# Patient Record
Sex: MALE | Race: WHITE
[De-identification: names, ages, dates, MRNs, and addresses within clinical notes are randomized per-mention and may not be internally consistent; named-entity substitution may affect disease eponyms.]

---

## 2021-01-20 ENCOUNTER — TRANSCRIPTION ENCOUNTER (OUTPATIENT)
Age: 71
End: 2021-01-20

## 2022-04-07 ENCOUNTER — APPOINTMENT (OUTPATIENT)
Dept: PHYSICAL MEDICINE AND REHAB | Facility: CLINIC | Age: 72
End: 2022-04-07

## 2022-04-07 VITALS
TEMPERATURE: 97.8 F | BODY MASS INDEX: 25.62 KG/M2 | HEART RATE: 80 BPM | HEIGHT: 71 IN | OXYGEN SATURATION: 99 % | WEIGHT: 183 LBS

## 2022-04-07 DIAGNOSIS — Z78.9 OTHER SPECIFIED HEALTH STATUS: ICD-10-CM

## 2022-04-07 DIAGNOSIS — I10 ESSENTIAL (PRIMARY) HYPERTENSION: ICD-10-CM

## 2022-04-07 DIAGNOSIS — E78.5 HYPERLIPIDEMIA, UNSPECIFIED: ICD-10-CM

## 2022-04-07 PROBLEM — Z00.00 ENCOUNTER FOR PREVENTIVE HEALTH EXAMINATION: Status: ACTIVE | Noted: 2022-04-07

## 2022-04-07 RX ORDER — PROPRANOLOL HYDROCHLORIDE 10 MG/1
10 TABLET ORAL
Refills: 0 | Status: ACTIVE | COMMUNITY

## 2022-04-07 RX ORDER — CYCLOBENZAPRINE HYDROCHLORIDE 5 MG/1
5 TABLET, FILM COATED ORAL 3 TIMES DAILY
Qty: 30 | Refills: 0 | Status: ACTIVE | COMMUNITY
Start: 2022-04-07 | End: 1900-01-01

## 2022-04-07 RX ORDER — VALSARTAN 320 MG/1
320 TABLET, COATED ORAL
Refills: 0 | Status: ACTIVE | COMMUNITY

## 2022-04-07 RX ORDER — ROSUVASTATIN CALCIUM 20 MG/1
20 TABLET, FILM COATED ORAL
Refills: 0 | Status: ACTIVE | COMMUNITY

## 2022-04-07 RX ORDER — EZETIMIBE 10 MG/1
10 TABLET ORAL
Refills: 0 | Status: ACTIVE | COMMUNITY

## 2022-04-07 RX ORDER — CHLORTHALIDONE 25 MG/1
25 TABLET ORAL
Refills: 0 | Status: ACTIVE | COMMUNITY

## 2022-04-07 NOTE — ASSESSMENT
[FreeTextEntry1] : Flexeril 5 mg po TID #30\par HEP reviewed with patient. \par Recheck in 2 weeks if symptomsl persist initiation of trigger point injections the to the right parascapular musculature to dissipate muscle spasm.

## 2022-04-07 NOTE — DATA REVIEWED
[FreeTextEntry1] : MRI RIGHT SHOULDER NOÉ\par 12/09/2021\par DR. TURNER PROGRESS NOTES \par 12/09/2021 & 01/20/2022

## 2022-04-07 NOTE — PHYSICAL EXAM
[Normal] : Normal skin color and pigmentation, normal turgor and no rash [FreeTextEntry1] : EXAMINATION OF THE LEFT SHOULDER: \par On palpation, parascapular muscle spasm.\par Range of motion testing was performed with the use of a goniometer.  On range of motion testing, \par active flexion was to 170º,  (normal flexion - 180º), active abduction was to 175º, (normal abduction - 180º), active external rotation was to 80º, (normal external rotation - 90º), active internal rotation was to 65º, (normal internal rotation - 90º), active extension was to 40º, (normal extension - 45º-60º). \par Anterior and Posterior Apprehension Testing was (- ) \par There was a mildly +  Impingement Sign.  Supraspinatus Test was (+).  \par MMT Flexion 5-/5, abduction 5-/5, external rotation 5-/5, and  internal rotation 5/5.\par Good scapula mobility\par No scapula winging \par \par Examination of the Cervical Spine \par Good ROM of cervical spine all planes. Cervical spine is non tender \par Reflexes are 2's and symmetrical throughout the upper extremities. \par Sensory: Intact [de-identified] : see exam  [de-identified] : see exam  [de-identified] : see exam

## 2022-04-07 NOTE — CONSULT LETTER
[Dear  ___] : Dear  [unfilled], [Consult Letter:] : I had the pleasure of evaluating your patient, [unfilled]. [Please see my note below.] : Please see my note below. [Consult Closing:] : Thank you very much for allowing me to participate in the care of this patient.  If you have any questions, please do not hesitate to contact me. [Sincerely,] : Sincerely, [FreeTextEntry3] : Julian Anne M.D

## 2022-04-27 ENCOUNTER — APPOINTMENT (OUTPATIENT)
Dept: PHYSICAL MEDICINE AND REHAB | Facility: CLINIC | Age: 72
End: 2022-04-27
Payer: MEDICARE

## 2022-04-27 PROCEDURE — 99213 OFFICE O/P EST LOW 20 MIN: CPT

## 2022-04-27 NOTE — HISTORY OF PRESENT ILLNESS
[FreeTextEntry1] : Pt presents in my office today for eval of right knee pain with limited ROM onset of  right knee pain approx 3 weeks ago after gardening. Pain is aggravated with ambulation and swelling since then

## 2022-04-27 NOTE — PHYSICAL EXAM
[FreeTextEntry1] : Right  Knee Examination \par Flexion: 105 degrees\par Extension:full\par \par Palpation:medial joint ,distal medial hamstring tender\par \par MMT:knee extension 5-/5\par \par Swelling: mild\par No increase temperature: \par Patella Compression: +\par Esvin Test:+\par Crepitus:+\par Anterior and posterior draw - \par Collateral ligaments intact

## 2022-04-28 ENCOUNTER — RESULT REVIEW (OUTPATIENT)
Age: 72
End: 2022-04-28

## 2022-05-03 ENCOUNTER — APPOINTMENT (OUTPATIENT)
Dept: PHYSICAL MEDICINE AND REHAB | Facility: CLINIC | Age: 72
End: 2022-05-03
Payer: MEDICARE

## 2022-05-03 PROCEDURE — 20553 NJX 1/MLT TRIGGER POINTS 3/>: CPT

## 2022-05-03 PROCEDURE — J3490M: CUSTOM

## 2022-05-03 PROCEDURE — 99213 OFFICE O/P EST LOW 20 MIN: CPT | Mod: 25

## 2022-05-10 ENCOUNTER — APPOINTMENT (OUTPATIENT)
Dept: PHYSICAL MEDICINE AND REHAB | Facility: CLINIC | Age: 72
End: 2022-05-10
Payer: MEDICARE

## 2022-05-10 PROCEDURE — 99213 OFFICE O/P EST LOW 20 MIN: CPT | Mod: 25

## 2022-05-10 PROCEDURE — 20553 NJX 1/MLT TRIGGER POINTS 3/>: CPT

## 2022-05-12 ENCOUNTER — APPOINTMENT (OUTPATIENT)
Dept: ORTHOPEDIC SURGERY | Facility: CLINIC | Age: 72
End: 2022-05-12
Payer: MEDICARE

## 2022-05-12 VITALS — HEIGHT: 71 IN | WEIGHT: 183 LBS | BODY MASS INDEX: 25.62 KG/M2

## 2022-05-12 DIAGNOSIS — Z78.9 OTHER SPECIFIED HEALTH STATUS: ICD-10-CM

## 2022-05-12 DIAGNOSIS — M76.891 OTHER SPECIFIED ENTHESOPATHIES OF RIGHT LOWER LIMB, EXCLUDING FOOT: ICD-10-CM

## 2022-05-12 DIAGNOSIS — Z86.79 PERSONAL HISTORY OF OTHER DISEASES OF THE CIRCULATORY SYSTEM: ICD-10-CM

## 2022-05-12 DIAGNOSIS — Z86.39 PERSONAL HISTORY OF OTHER ENDOCRINE, NUTRITIONAL AND METABOLIC DISEASE: ICD-10-CM

## 2022-05-12 DIAGNOSIS — S83.241A OTHER TEAR OF MEDIAL MENISCUS, CURRENT INJURY, RIGHT KNEE, INITIAL ENCOUNTER: ICD-10-CM

## 2022-05-12 PROCEDURE — 99214 OFFICE O/P EST MOD 30 MIN: CPT

## 2022-05-12 NOTE — ASSESSMENT
[FreeTextEntry1] : 72yo male with MRI finding of medial meniscus tear.  Pain localized to posterior knee along distal aspect of hamstrings.  Advised patient to continue nonsurgical management of hamstring tendonitis with PMR Dr. Anne.  His pain at this time is not localized to medial joint and unlikley related to meniscus tear.  Would not recommend arthroscopic surgery at this time since his clinical exam is more consistent with hamstring strain/tendonitis.\par \par f/u prn

## 2022-05-12 NOTE — HISTORY OF PRESENT ILLNESS
[Sudden] : sudden [6] : 6 [2] : 2 [Dull/Aching] : dull/aching [Sharp] : sharp [Throbbing] : throbbing [Intermittent] : intermittent [Household chores] : household chores [Leisure] : leisure [Sleep] : sleep [Social interactions] : social interactions [Rest] : rest [Retired] : Work status: retired [de-identified] : Patient is here for his right knee. Patient states he stepped back and started to feel a sharp pain. Patient states this happened approx 6 weeks ago.  Patient states he is having most of his pain in the back of the knee. Patient had MRI/X-ray  done at [facility] \par \par IMPRESSION:\par \par \par Complex medial meniscus tear with a posterior 7 mm flap meniscal fragment and\par moderate reactive medial collateral ligament sprain and medial tibial plateau\par edema. Small-moderate joint effusion. Mild cartilage wear. [] : Post Surgical Visit: no [FreeTextEntry1] : Right knee [FreeTextEntry3] : 6 weeks ago  [FreeTextEntry5] : Patient states he stepped back and started to feel a sharp pain.  [de-identified] : movement  [de-identified] : Dr. Anne  [de-identified] : MRI/X-ray at [facility]

## 2022-05-17 ENCOUNTER — APPOINTMENT (OUTPATIENT)
Dept: PHYSICAL MEDICINE AND REHAB | Facility: CLINIC | Age: 72
End: 2022-05-17
Payer: MEDICARE

## 2022-05-17 PROCEDURE — 20553 NJX 1/MLT TRIGGER POINTS 3/>: CPT

## 2022-05-17 PROCEDURE — 99213 OFFICE O/P EST LOW 20 MIN: CPT | Mod: 25

## 2022-05-17 RX ORDER — DEXAMETHASONE SODIUM PHOSPHATE 4 MG/ML
4 INJECTION, SOLUTION INTRAMUSCULAR; INTRAVENOUS
Qty: 0 | Refills: 0 | Status: COMPLETED | OUTPATIENT
Start: 2022-05-17

## 2022-05-17 RX ORDER — LIDOCAINE HYDROCHLORIDE 10 MG/ML
1 INJECTION, SOLUTION INFILTRATION; PERINEURAL
Refills: 0 | Status: COMPLETED | OUTPATIENT
Start: 2022-05-17

## 2022-05-24 ENCOUNTER — APPOINTMENT (OUTPATIENT)
Dept: PHYSICAL MEDICINE AND REHAB | Facility: CLINIC | Age: 72
End: 2022-05-24

## 2022-05-24 DIAGNOSIS — M23.91 UNSPECIFIED INTERNAL DERANGEMENT OF RIGHT KNEE: ICD-10-CM

## 2022-05-24 DIAGNOSIS — M17.5 OTHER UNILATERAL SECONDARY OSTEOARTHRITIS OF KNEE: ICD-10-CM

## 2022-05-24 PROCEDURE — 99213 OFFICE O/P EST LOW 20 MIN: CPT | Mod: 25

## 2022-05-24 PROCEDURE — 20553 NJX 1/MLT TRIGGER POINTS 3/>: CPT

## 2022-05-31 ENCOUNTER — APPOINTMENT (OUTPATIENT)
Dept: PHYSICAL MEDICINE AND REHAB | Facility: CLINIC | Age: 72
End: 2022-05-31

## 2022-06-07 ENCOUNTER — APPOINTMENT (OUTPATIENT)
Dept: PHYSICAL MEDICINE AND REHAB | Facility: CLINIC | Age: 72
End: 2022-06-07
Payer: MEDICARE

## 2022-06-07 PROCEDURE — 99213 OFFICE O/P EST LOW 20 MIN: CPT | Mod: 25

## 2022-06-07 PROCEDURE — 20553 NJX 1/MLT TRIGGER POINTS 3/>: CPT

## 2022-06-21 ENCOUNTER — APPOINTMENT (OUTPATIENT)
Dept: PHYSICAL MEDICINE AND REHAB | Facility: CLINIC | Age: 72
End: 2022-06-21

## 2022-06-21 RX ORDER — LIDOCAINE HYDROCHLORIDE 10 MG/ML
1 INJECTION, SOLUTION INFILTRATION; PERINEURAL
Qty: 0 | Refills: 0 | Status: COMPLETED | OUTPATIENT
Start: 2022-05-24

## 2022-06-21 RX ADMIN — Medication 10 %: at 00:00

## 2022-06-28 ENCOUNTER — APPOINTMENT (OUTPATIENT)
Dept: PHYSICAL MEDICINE AND REHAB | Facility: CLINIC | Age: 72
End: 2022-06-28

## 2022-06-28 DIAGNOSIS — M75.81 OTHER SHOULDER LESIONS, RIGHT SHOULDER: ICD-10-CM

## 2022-06-28 PROCEDURE — 99213 OFFICE O/P EST LOW 20 MIN: CPT | Mod: 25

## 2022-06-28 PROCEDURE — 20553 NJX 1/MLT TRIGGER POINTS 3/>: CPT

## 2022-06-28 RX ORDER — LIDOCAINE HYDROCHLORIDE 10 MG/ML
1 INJECTION, SOLUTION INFILTRATION; PERINEURAL
Refills: 0 | Status: COMPLETED | OUTPATIENT
Start: 2022-06-28

## 2022-06-28 RX ADMIN — Medication 10 %: at 00:00

## 2022-06-30 ENCOUNTER — RESULT REVIEW (OUTPATIENT)
Age: 72
End: 2022-06-30

## 2022-07-07 ENCOUNTER — APPOINTMENT (OUTPATIENT)
Dept: PHYSICAL MEDICINE AND REHAB | Facility: CLINIC | Age: 72
End: 2022-07-07

## 2022-07-07 DIAGNOSIS — M75.80 OTHER SHOULDER LESIONS, UNSPECIFIED SHOULDER: ICD-10-CM

## 2022-07-07 DIAGNOSIS — M60.811: ICD-10-CM

## 2022-07-07 DIAGNOSIS — M24.811 OTHER SPECIFIC JOINT DERANGEMENTS OF RIGHT SHOULDER, NOT ELSEWHERE CLASSIFIED: ICD-10-CM

## 2022-07-07 PROCEDURE — 20553 NJX 1/MLT TRIGGER POINTS 3/>: CPT

## 2022-07-07 PROCEDURE — 99213 OFFICE O/P EST LOW 20 MIN: CPT | Mod: 25

## 2022-07-07 RX ADMIN — Medication 10 %: at 00:00

## 2022-07-30 ENCOUNTER — APPOINTMENT (OUTPATIENT)
Dept: PAIN MANAGEMENT | Facility: CLINIC | Age: 72
End: 2022-07-30

## 2022-07-30 VITALS — WEIGHT: 183 LBS | HEIGHT: 71 IN | BODY MASS INDEX: 25.62 KG/M2

## 2022-07-30 PROCEDURE — 99204 OFFICE O/P NEW MOD 45 MIN: CPT

## 2022-07-30 RX ORDER — MELOXICAM 15 MG/1
15 TABLET ORAL
Qty: 30 | Refills: 2 | Status: ACTIVE | COMMUNITY
Start: 2022-07-30 | End: 1900-01-01

## 2022-07-30 NOTE — DATA REVIEWED
[MRI] : MRI [Right] : of the right [Knee] : knee [Cervical Spine] : cervical spine [Report was reviewed and noted in the chart] : The report was reviewed and noted in the chart [I reviewed the films/CD] : I reviewed the films/CD

## 2022-07-30 NOTE — ASSESSMENT
[FreeTextEntry1] : A discussion regarding available pain management treatment options occurred with the patient.  These included interventional, rehabilitative, pharmacological, and alternative modalities. We will proceed with the following: \par \par Interventional treatment options: \par - Proceed with right PM C7-T1 CARMELITA with fluoroscopic guidance \par - failed TPI with outside provider\par - see additional instructions below \par \par Rehabilitative options: \par - failed abbreviated trial of physical therapy \par - may consider retrial once adequate relief \par - participation in active HEP was discussed \par \par Medication based treatment options: \par - initiate trial of meloxicam 7.5-15mg x 7-10 days then PRN  \par - continue Tylenol 500-1000 mg TID PRN \par - see additional instructions below \par \par Complementary treatment options: \par - Weight management and lifestyle modifications discussed \par - See additional instructions below \par \par Additional treatment recommendations as follows: \par - patient with follow up with Dr. Churchill for surgical consultation this week\par - Follow up 1-2 weeks post injection for assessment of efficacy and further recommendations.\par \par The risks, benefits and alternatives of the proposed procedure were explained in detail with the patient. The risks outlined include but are not limited to infection, bleeding, post- dural puncture headache, nerve injury, a temporary increase in pain, failure to resolve symptoms, allergic reaction, and possible elevation of blood sugar in diabetics if using corticosteroid.  All questions were answered to patient's apparent satisfaction and he/she verbalized an understanding.\par \par The documentation recorded by the scribe, in my presence, accurately reflects the service I personally performed and the decisions made by me with my edits as appropriate.\par \par I, Camelia Jamil acting as scribe, attest that this documentation has been prepared under the direction and in the presence of Provider Ketan Mcclellan DO.

## 2022-07-30 NOTE — PHYSICAL EXAM
[Normal Coordination] : normal coordination [Normal Mood and Affect] : normal mood and affect [Orientated] : orientated [Able to Communicate] : able to communicate [Well Developed] : well developed [Extension] : extension [Right] : right shoulder [de-identified] : some difficulty with tandem gait  [] : no tenderness to palpation

## 2022-07-30 NOTE — HISTORY OF PRESENT ILLNESS
[Neck] : neck [Lower back] : lower back [8] : 8 [5] : 5 [Dull/Aching] : dull/aching [Radiating] : radiating [Constant] : constant [Household chores] : household chores [Meds] : meds [Sleep] : sleep [Sitting] : sitting [Standing] : standing [Walking] : walking [Bending forward] : bending forward [Lying in bed] : lying in bed [FreeTextEntry1] : The patient presents for initial evaluation regarding their neck pain. Patient was referred by Omid. Patient c/o neck pain radiating to the RUE with associated paraesthesia. Focus of pain is to the top of the right shoulder. He reports having TPI's to the affected area with no observable benefit. Denies weakness and motor changes. Patient reports he underwent lumbar spine sx x 2 which resulted in a left foot drop. \par \par Subjective Weakness: No \par Numbness/Tingling: Yes \par Bladder/Bowel dysfunction: No \par Gait Abnormalities: No \par Fine motor coordination changes: No \par \par Injections: Yes\par 1) TPI's   \par \par Pertinent Surgical History: N/A \par \par Imaging: \par MRI Cervical Spine (06/30/22) - [facility]\par \par At C2-3: There is central disc protrusion mildly narrowing the spinal canal without compression of the spinal cord.\par At C3-4: There is minimal disc bulge mildly narrowing the spinal canal without compression of the spinal cord. There is mild right neuroforaminal stenosis.\par At C4-5: There is disc bulge and buckling ligamentum flavum mildly narrowing the spinal canal without compression of the spinal cord.\par At C5-6: There is disc bulge and right central disc protrusion moderately narrowing the spinal canal minimally indenting of the spinal cord. There is severe bilateral neural foraminal stenosis.\par At C6-7: There is left central disc protrusion moderately narrowing the spinal canal. There is uncinate hypertrophy with severe bilateral neural foraminal stenosis.\par At C7-T1: No significant spinal canal or neural foraminal stenosis.\par \par Physician Disclaimer: I have personally reviewed and confirmed all HPI data with the patient. [] : no [FreeTextEntry7] : pins and needles rt arm  [de-identified] : [facility]

## 2022-08-04 ENCOUNTER — APPOINTMENT (OUTPATIENT)
Dept: ORTHOPEDIC SURGERY | Facility: CLINIC | Age: 72
End: 2022-08-04

## 2022-08-04 VITALS — BODY MASS INDEX: 25.62 KG/M2 | HEIGHT: 71 IN | WEIGHT: 183 LBS

## 2022-08-04 PROCEDURE — 99214 OFFICE O/P EST MOD 30 MIN: CPT

## 2022-08-04 NOTE — HISTORY OF PRESENT ILLNESS
[Neck] : neck [Gradual] : gradual [8] : 8 [Sharp] : sharp [Tightness] : tightness [Constant] : constant [Nothing helps with pain getting better] : Nothing helps with pain getting better [Retired] : Work status: retired [de-identified] : Patient presents today with right shoulder pain for 7 months with NKI. Previously saw Dr. Anne and had right shoulder CSIs. Dr. Anne felt the pain could be stemming from the neck. Dr. Mcclellan plans on scheduling an injection but is waiting for Dr Churchill's opinion. Tried taking Meloxicam but discontinued due to severe headache. Experiencing pain between neck and right shoulder. Has difficulty sleeping at night. Taking Motrin PRN. Had cervical spine MRI at . [] : no [FreeTextEntry1] : Right Shoulder [de-identified] : cervical spine MRI at

## 2022-08-04 NOTE — ASSESSMENT
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 \par \par Patient will begin physical therapy. \par \par Recommend: - NSAID - Heating pad - Muscle relaxer - Neck stretching exercise - Soft cervical collar - Cervical traction Patient is given neck rehabilitation exercise book.

## 2022-08-26 ENCOUNTER — APPOINTMENT (OUTPATIENT)
Dept: PAIN MANAGEMENT | Facility: CLINIC | Age: 72
End: 2022-08-26

## 2022-08-26 PROCEDURE — 62321 NJX INTERLAMINAR CRV/THRC: CPT

## 2022-08-26 NOTE — PROCEDURE
[FreeTextEntry3] : Date of Service: 08/26/2022 \par \par Account: [id number]\par \par Patient: MIKA SHELLEY \par \par YOB: 1950\par \par Age: 72 year\par \par Surgeon:      Ketan Mcclellan DO\par \par Assistant:    None\par \par Pre-Operative Diagnosis:         Cervical Radiculopathy (M54.12)\par \par Post Operative Diagnosis:       Cervical Radiculopathy (M54.12)\par \par Procedure:             Right paramedian (C7-T1) interlaminar epidural steroid injection under fluoroscopic guidance\par \par Anesthesia:  MAC\par \par This procedure was carried out using fluoroscopic guidance.  The risks and benefits of the procedure were discussed extensively with the patient.  The consent of the patient was obtained and the following procedure was performed.  A timeout was performed with all essential staff present and the site and side were verified.\par \par The patient was placed in the prone position and optimized to patient comfort.  The cervical area was prepped and draped in a sterile fashion.  The fluoroscope visualized the C7-T1 interspace using slight cephalad-caudad angulation and this area was marked.  Using sterile technique the superficial skin was anesthetized with 1% Lidocaine.  A 20 gauge 3.5 inch Tuohy needle was advanced under fluoroscopy until ligament was engaged.  Using a contralateral oblique view, a "loss of resistance" to air technique was utilized in order to gain access to the epidural space.  After negative aspiration for heme and CSF, 1 cc of Omnipaque contrast was administered and the appropriate cervical epidurogram was obtained in the CHRISTOPHER and A/P view as well as digital subtraction angiography.\par \par A total injectate of 3 cc of preservative free normal saline and 40 mg of Kenalog was then injected into the epidural space while maintaining meaningful verbal contact with the patient.  \par \par The needle was subsequently removed.  Vital signs remained normal.  Pulse oximeter was used throughout the procedure and the patient's pulse and oxygen saturation remained within normal limits.  The patient tolerated the procedure well.  There were no complications.  The patient was instructed to apply ice over the injection sites for twenty minutes every two hours for the next 24 to 48 hours.\par \par Disposition:\par      1. The patient was advised to F/U in 1-2 weeks to assess the response to the injection.\par      2. The patient was also instructed to contact me immediately if there were any concerns related to the procedure performed.

## 2022-08-30 ENCOUNTER — APPOINTMENT (OUTPATIENT)
Dept: PHYSICAL MEDICINE AND REHAB | Facility: CLINIC | Age: 72
End: 2022-08-30

## 2022-09-27 ENCOUNTER — APPOINTMENT (OUTPATIENT)
Dept: PAIN MANAGEMENT | Facility: CLINIC | Age: 72
End: 2022-09-27

## 2022-09-27 VITALS — WEIGHT: 183 LBS | HEIGHT: 71 IN | BODY MASS INDEX: 25.62 KG/M2

## 2022-09-27 PROCEDURE — 99214 OFFICE O/P EST MOD 30 MIN: CPT

## 2022-09-27 NOTE — ASSESSMENT
[FreeTextEntry1] : A discussion regarding available pain management treatment options occurred with the patient.  These included interventional, rehabilitative, pharmacological, and alternative modalities. We will proceed with the following: \par \par Interventional treatment options: \par - would repeat right PM C7-T1 CARMELITA (80mg Kenalog) with return of severe radicular pain - will call\par - failed TPI with outside provider\par - see additional instructions below \par \par Rehabilitative options: \par - failed abbreviated trial of pt; defers retrial at this point \par - participation in active HEP was discussed \par \par Medication based treatment options: \par - continue meloxicam 7.5-15mg PRN  \par - continue Tylenol 500-1000 mg TID PRN \par - see additional instructions below \par \par Complementary treatment options: \par - Weight management and lifestyle modifications discussed \par - See additional instructions below \par \par Additional treatment recommendations as follows: \par - patient with follow up with Dr. Churchill as directed\par - Follow up for indicated procedure or PRN basis\par \par The risks, benefits and alternatives of the proposed procedure were explained in detail with the patient. The risks outlined include but are not limited to infection, bleeding, post- dural puncture headache, nerve injury, a temporary increase in pain, failure to resolve symptoms, allergic reaction, and possible elevation of blood sugar in diabetics if using corticosteroid.  All questions were answered to patient's apparent satisfaction and he/she verbalized an understanding.\par \par I, Maria Elena Giraldo, acting as scribe, attest that this documentation has been prepared under the direction and in the presence of Provider Ketan Mcclellan DO.\par \par The documentation recorded by the scribe, in my presence, accurately reflects the service I personally performed and the decisions made by me with my edits as appropriate.

## 2022-09-27 NOTE — HISTORY OF PRESENT ILLNESS
[Neck] : neck [Lower back] : lower back [8] : 8 [5] : 5 [Dull/Aching] : dull/aching [Radiating] : radiating [Constant] : constant [Household chores] : household chores [Sleep] : sleep [Meds] : meds [Sitting] : sitting [Standing] : standing [Walking] : walking [Bending forward] : bending forward [Lying in bed] : lying in bed [FreeTextEntry1] : 09/27/2022 - Patient presents today for a follow-up visit s/p right paramedian (C7-T1) interlaminar epidural steroid injection on 08/26/2022. Patient reports he experienced moderate relief from procedure. States his pain prior to procedure was 7-8/10, now it is 4/10, exclaims he is very content with the injection. He notes that he went to physical therapy but experienced discomfort so he will be discontinuing pt. He is concerned for HTN so will discontinue use of Meloxicam. \par \par 07/30/2022 - The patient presents for initial evaluation regarding their neck pain. Patient was referred by Omid. Patient c/o neck pain radiating to the RUE with associated paraesthesia. Focus of pain is to the top of the right shoulder. He reports having TPI's to the affected area with no observable benefit. Denies weakness and motor changes. Patient reports he underwent lumbar spine sx x 2 which resulted in a left foot drop. \par \par Injections:\par 1) TPI's\par 2) C7-T1 CARMELITA ( 8/26/22) \par \par Pertinent Surgical History: N/A \par \par Imaging: \par MRI Cervical Spine (06/30/22) - [facility]\par \par At C2-3: There is central disc protrusion mildly narrowing the spinal canal without compression of the spinal cord.\par At C3-4: There is minimal disc bulge mildly narrowing the spinal canal without compression of the spinal cord. There is mild right neuroforaminal stenosis.\par At C4-5: There is disc bulge and buckling ligamentum flavum mildly narrowing the spinal canal without compression of the spinal cord.\par At C5-6: There is disc bulge and right central disc protrusion moderately narrowing the spinal canal minimally indenting of the spinal cord. There is severe bilateral neural foraminal stenosis.\par At C6-7: There is left central disc protrusion moderately narrowing the spinal canal. There is uncinate hypertrophy with severe bilateral neural foraminal stenosis.\par At C7-T1: No significant spinal canal or neural foraminal stenosis.\par \par Physician Disclaimer: I have personally reviewed and confirmed all HPI data with the patient. [] : no [FreeTextEntry7] : pins and needles rt arm  [de-identified] : [facility]

## 2022-09-27 NOTE — PHYSICAL EXAM
[Extension] : extension [Right] : right shoulder [de-identified] : Constitutional:\par - No acute distress\par - Well developed; well nourished\par \par Neurological:\par - normal mood and affect\par - alert and oriented x 3\par \par Cardiovascular\par - grossly normal [] : negative Weeks reflex [de-identified] : some difficulty with tandem gait

## 2022-10-06 ENCOUNTER — APPOINTMENT (OUTPATIENT)
Dept: ORTHOPEDIC SURGERY | Facility: CLINIC | Age: 72
End: 2022-10-06

## 2023-02-14 ENCOUNTER — APPOINTMENT (OUTPATIENT)
Dept: PAIN MANAGEMENT | Facility: CLINIC | Age: 73
End: 2023-02-14
Payer: MEDICARE

## 2023-02-14 VITALS — BODY MASS INDEX: 25.62 KG/M2 | WEIGHT: 183 LBS | HEIGHT: 71 IN

## 2023-02-14 DIAGNOSIS — M62.838 OTHER MUSCLE SPASM: ICD-10-CM

## 2023-02-14 DIAGNOSIS — M50.320 OTHER CERVICAL DISC DEGENERATION, MID-CERVICAL REGION, UNSPECIFIED LEVEL: ICD-10-CM

## 2023-02-14 DIAGNOSIS — M50.90 CERVICAL DISC DISORDER, UNSPECIFIED, UNSPECIFIED CERVICAL REGION: ICD-10-CM

## 2023-02-14 PROCEDURE — 99214 OFFICE O/P EST MOD 30 MIN: CPT

## 2023-02-14 NOTE — PHYSICAL EXAM
[de-identified] : Constitutional:\par - No acute distress\par - Well developed; well nourished\par \par Neurological:\par - normal mood and affect\par - alert and oriented x 3\par \par Cardiovascular\par - grossly normal\par \par Cervical Spine Exam: \par \par Inspection:  \par erythema (-)  \par ecchymosis (-)  \par rashes (-)  \par \par Palpation:                                                   \par Cervical paraspinal tenderness:         R (-); L(-) \par Upper trapezius tenderness:              R (+); L (-) \par Rhomboids tenderness:                      R (-); L (-) \par Occipital Ridge:                                    R (-); L (-) \par Supraspinatus tenderness:                 R (-); L (-) \par \par ROM: Full ROM with mild stiffness and pain at extremes of extension\par \par Strength Testing:             \par Deltoid                           R (5/5); L (5/5) \par Biceps:                          R (5/5); L (5/5) \par Triceps:                         R (5/5); L (5/5) \par Finger Abductors:         R (5/5); L (5/5) \par Grasp:                           R (5/5); L (5/5) \par \par Special Testing: \par Spurling Test:                  R (+); L (-) \par Facet load test:               R (-); L (-) \par \par Neuro: \par SILT throughout right upper extremity \par SILT throughout left upper extremity \par \par Reflexes: \par Biceps   -           R (2+); L (2+) \par Triceps  -           R (2+); L (2+) \par Brachioradialis- R (2+); L (2+)   \par \par No ankle clonus

## 2023-02-14 NOTE — HISTORY OF PRESENT ILLNESS
[Neck] : neck [Lower back] : lower back [8] : 8 [5] : 5 [Dull/Aching] : dull/aching [Radiating] : radiating [Constant] : constant [Household chores] : household chores [Sleep] : sleep [Meds] : meds [Sitting] : sitting [Standing] : standing [Walking] : walking [Bending forward] : bending forward [Lying in bed] : lying in bed [FreeTextEntry1] : 2/14/2023 - Patient presents for FUV regarding his neck pain.  He complains of worsening neck pain with radiation to his right trap and down his RUE associated with numbness/tingling.  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  He obtained sustained relief from prior CARMELITA until last month.   Denies weakness, ataxia, b/b dysfunction.  Not taking analgesic mediation at this time. \par \par 09/27/2022 - Patient presents today for a follow-up visit s/p right paramedian (C7-T1) interlaminar epidural steroid injection on 08/26/2022. Patient reports he experienced moderate relief from procedure. States his pain prior to procedure was 7-8/10, now it is 4/10, exclaims he is very content with the injection. He notes that he went to physical therapy but experienced discomfort so he will be discontinuing pt. He is concerned for HTN so will discontinue use of Meloxicam. \par \par 07/30/2022 - The patient presents for initial evaluation regarding their neck pain. Patient was referred by Omid. Patient c/o neck pain radiating to the RUE with associated paraesthesia. Focus of pain is to the top of the right shoulder. He reports having TPI's to the affected area with no observable benefit. Denies weakness and motor changes. Patient reports he underwent lumbar spine sx x 2 which resulted in a left foot drop. \par \par Injections:\par 1) TPI's\par 2) C7-T1 CARMELITA ( 8/26/22) \par \par Pertinent Surgical History: N/A \par \par Imaging: \par MRI Cervical Spine (06/30/22) - [facility]\par \par At C2-3: There is central disc protrusion mildly narrowing the spinal canal without compression of the spinal cord.\par At C3-4: There is minimal disc bulge mildly narrowing the spinal canal without compression of the spinal cord. There is mild right neuroforaminal stenosis.\par At C4-5: There is disc bulge and buckling ligamentum flavum mildly narrowing the spinal canal without compression of the spinal cord.\par At C5-6: There is disc bulge and right central disc protrusion moderately narrowing the spinal canal minimally indenting of the spinal cord. There is severe bilateral neural foraminal stenosis.\par At C6-7: There is left central disc protrusion moderately narrowing the spinal canal. There is uncinate hypertrophy with severe bilateral neural foraminal stenosis.\par At C7-T1: No significant spinal canal or neural foraminal stenosis.\par \par Physician Disclaimer: I have personally reviewed and confirmed all HPI data with the patient. [] : no [FreeTextEntry7] : pins and needles rt arm  [de-identified] : C [facility] L MRI

## 2023-02-14 NOTE — ASSESSMENT
[FreeTextEntry1] : A discussion regarding available pain management treatment options occurred with the patient.  These included interventional, rehabilitative, pharmacological, and alternative modalities. We will proceed with the following: \par \par Interventional treatment options: \par - Proceed with repeat right PM C7-T1 CARMELITA (80 mg Kenalog) with fluoroscopic guidance\par - failed TPI with outside provider\par - see additional instructions below \par \par Rehabilitative options: \par - failed abbreviated trial of pt; defers retrial at this point \par - participation in active HEP was discussed \par \par Medication based treatment options: \par - continue meloxicam 7.5-15 mg PRN  \par - continue Tylenol 500-1000 mg TID PRN\par - Can consider anti-neuropathic medication trial with failure of interventional therapy\par - see additional instructions below \par \par Complementary treatment options: \par - Weight management and lifestyle modifications discussed \par \par Additional treatment recommendations as follows: \par - patient with follow up with Dr. Churchill as directed\par - Follow up 1-2 weeks post injection for assessment of efficacy and further recommendations.\par \par The risks, benefits and alternatives of the proposed procedure were explained in detail with the patient.  The risks outlined include, but are not limited to, infection, bleeding, nerve injury, post dural headache, a temporary increase in pain, failure to resolve symptoms, allergic reaction, and possible elevation of blood sugar in diabetics if using corticosteroid.  All questions were answered to patient's apparent satisfaction and he/she verbalized an understanding.\par \par I, Giovanny PEARL, personally performed the services described in this documentation incident to Ketan Mcclellan DO.\par \par The documentation recorded by the scribe, in my presence, accurately reflects the service I personally performed, and the decisions made by me with my edits as appropriate.

## 2023-02-24 ENCOUNTER — APPOINTMENT (OUTPATIENT)
Dept: PAIN MANAGEMENT | Facility: CLINIC | Age: 73
End: 2023-02-24
Payer: MEDICARE

## 2023-02-24 PROCEDURE — 62321 NJX INTERLAMINAR CRV/THRC: CPT

## 2023-02-24 NOTE — PROCEDURE
[FreeTextEntry3] : Date of Service: 02/24/2023 \par \par Account: [id number]\par \par Patient: MIKA SHELLEY \par \par YOB: 1950\par \par Age: 72 year\par \par Surgeon:      Ketan Mcclellan DO\par \par Assistant:    None\par \par Pre-Operative Diagnosis:         Cervical Radiculopathy (M54.12)\par \par Post Operative Diagnosis:       Cervical Radiculopathy (M54.12)\par \par Procedure:             Right paramedian (C7-T1) interlaminar epidural steroid injection under fluoroscopic guidance\par \par Anesthesia:  MAC\par \par This procedure was carried out using fluoroscopic guidance.  The risks and benefits of the procedure were discussed extensively with the patient.  The consent of the patient was obtained and the following procedure was performed. The patient was placed in the prone position on the fluoroscopy table and the area was prepped and draped in a sterile fashion.  A timeout was performed with all essential staff present and the site and side were verified.\par \par The patient was placed in the prone position and optimized to patient comfort.  The cervical area was prepped and draped in a sterile fashion.  The fluoroscope visualized the C7-T1 interspace using slight cephalad-caudad angulation and this area was marked.  Using sterile technique the superficial skin was anesthetized with 1% Lidocaine.  A 20 gauge 3.5 inch Tuohy needle was advanced under fluoroscopy until ligament was engaged.  Using a contralateral oblique view, a "loss of resistance" to air technique was utilized in order to gain access to the epidural space.  After negative aspiration for heme and CSF, 1 cc of Omnipaque contrast was administered and the appropriate cervical epidurogram was obtained in the CHRISTOPHER and A/P view as well as digital subtraction angiography.\par \par A total injectate of 3 cc of preservative free normal saline and 80 mg of Kenalog was then injected into the epidural space while maintaining meaningful verbal contact with the patient.  \par \par Vital signs remained normal throughout the procedure.  The patient tolerated the procedure well.  There were no immediate complications from the performed procedure.  The patient was instructed to apply ice over the injection sites for twenty minutes every two hours for the next 24 hours.\par \par Disposition:\par      1. The patient was advised to F/U in 1-2 weeks to assess the response to the injection.\par      2. The patient was also instructed to contact me immediately if there were any concerns related to the procedure performed.

## 2023-03-09 ENCOUNTER — APPOINTMENT (OUTPATIENT)
Dept: PAIN MANAGEMENT | Facility: CLINIC | Age: 73
End: 2023-03-09

## 2023-09-21 ENCOUNTER — APPOINTMENT (OUTPATIENT)
Dept: PAIN MANAGEMENT | Facility: CLINIC | Age: 73
End: 2023-09-21

## 2023-09-25 ENCOUNTER — APPOINTMENT (OUTPATIENT)
Dept: PAIN MANAGEMENT | Facility: CLINIC | Age: 73
End: 2023-09-25
Payer: MEDICARE

## 2023-09-25 VITALS — WEIGHT: 185 LBS | HEIGHT: 71 IN | BODY MASS INDEX: 25.9 KG/M2

## 2023-09-25 DIAGNOSIS — M54.2 CERVICALGIA: ICD-10-CM

## 2023-09-25 DIAGNOSIS — Z87.39 PERSONAL HISTORY OF OTHER DISEASES OF THE MUSCULOSKELETAL SYSTEM AND CONNECTIVE TISSUE: ICD-10-CM

## 2023-09-25 DIAGNOSIS — M47.812 SPONDYLOSIS W/OUT MYELOPATHY OR RADICULOPATHY, CERVICAL REGION: ICD-10-CM

## 2023-09-25 DIAGNOSIS — M50.20 OTHER CERVICAL DISC DISPLACEMENT, UNSPECIFIED CERVICAL REGION: ICD-10-CM

## 2023-09-25 DIAGNOSIS — M48.02 SPINAL STENOSIS, CERVICAL REGION: ICD-10-CM

## 2023-09-25 PROCEDURE — 99213 OFFICE O/P EST LOW 20 MIN: CPT

## 2023-10-11 ENCOUNTER — APPOINTMENT (OUTPATIENT)
Dept: PAIN MANAGEMENT | Facility: CLINIC | Age: 73
End: 2023-10-11
Payer: MEDICARE

## 2023-10-11 PROCEDURE — 62321 NJX INTERLAMINAR CRV/THRC: CPT

## 2023-10-31 ENCOUNTER — APPOINTMENT (OUTPATIENT)
Dept: PAIN MANAGEMENT | Facility: CLINIC | Age: 73
End: 2023-10-31
Payer: MEDICARE

## 2023-10-31 VITALS — HEIGHT: 71 IN | WEIGHT: 182 LBS | BODY MASS INDEX: 25.48 KG/M2

## 2023-10-31 DIAGNOSIS — M54.12 RADICULOPATHY, CERVICAL REGION: ICD-10-CM

## 2023-10-31 DIAGNOSIS — M47.22 OTHER SPONDYLOSIS WITH RADICULOPATHY, CERVICAL REGION: ICD-10-CM

## 2023-10-31 DIAGNOSIS — M48.02 SPINAL STENOSIS, CERVICAL REGION: ICD-10-CM

## 2023-10-31 DIAGNOSIS — M50.220 OTHER CERVICAL DISC DISPLACEMENT, MID-CERVICAL REGION, UNSPECIFIED LEVEL: ICD-10-CM

## 2023-10-31 PROCEDURE — 99214 OFFICE O/P EST MOD 30 MIN: CPT

## 2023-11-01 PROBLEM — M47.22 SPONDYLOSIS OF CERVICAL SPINE WITH RADICULOPATHY: Status: ACTIVE | Noted: 2022-08-04

## 2023-11-01 PROBLEM — M50.220 HERNIATION OF INTERVERTEBRAL DISC OF MID-CERVICAL REGION: Status: ACTIVE | Noted: 2022-08-04

## 2024-10-08 ENCOUNTER — APPOINTMENT (OUTPATIENT)
Dept: PAIN MANAGEMENT | Facility: CLINIC | Age: 74
End: 2024-10-08
Payer: MEDICARE

## 2024-10-08 VITALS — HEIGHT: 71 IN | BODY MASS INDEX: 24.36 KG/M2 | WEIGHT: 174 LBS

## 2024-10-08 DIAGNOSIS — M50.320 OTHER CERVICAL DISC DEGENERATION, MID-CERVICAL REGION, UNSPECIFIED LEVEL: ICD-10-CM

## 2024-10-08 DIAGNOSIS — M47.22 OTHER SPONDYLOSIS WITH RADICULOPATHY, CERVICAL REGION: ICD-10-CM

## 2024-10-08 DIAGNOSIS — M54.12 RADICULOPATHY, CERVICAL REGION: ICD-10-CM

## 2024-10-08 DIAGNOSIS — M48.02 SPINAL STENOSIS, CERVICAL REGION: ICD-10-CM

## 2024-10-08 PROCEDURE — 99214 OFFICE O/P EST MOD 30 MIN: CPT

## 2024-10-25 ENCOUNTER — APPOINTMENT (OUTPATIENT)
Dept: PAIN MANAGEMENT | Facility: CLINIC | Age: 74
End: 2024-10-25
Payer: MEDICARE

## 2024-10-25 DIAGNOSIS — M54.12 RADICULOPATHY, CERVICAL REGION: ICD-10-CM

## 2024-10-25 PROCEDURE — 62321 NJX INTERLAMINAR CRV/THRC: CPT

## 2024-11-12 ENCOUNTER — APPOINTMENT (OUTPATIENT)
Dept: PAIN MANAGEMENT | Facility: CLINIC | Age: 74
End: 2024-11-12
Payer: MEDICARE

## 2024-11-12 VITALS — HEIGHT: 71 IN | WEIGHT: 173 LBS | BODY MASS INDEX: 24.22 KG/M2

## 2024-11-12 DIAGNOSIS — M48.02 SPINAL STENOSIS, CERVICAL REGION: ICD-10-CM

## 2024-11-12 DIAGNOSIS — M54.12 RADICULOPATHY, CERVICAL REGION: ICD-10-CM

## 2024-11-12 DIAGNOSIS — M50.220 OTHER CERVICAL DISC DISPLACEMENT, MID-CERVICAL REGION, UNSPECIFIED LEVEL: ICD-10-CM

## 2024-11-12 DIAGNOSIS — M50.320 OTHER CERVICAL DISC DEGENERATION, MID-CERVICAL REGION, UNSPECIFIED LEVEL: ICD-10-CM

## 2024-11-12 PROCEDURE — 99214 OFFICE O/P EST MOD 30 MIN: CPT

## 2024-12-11 ENCOUNTER — APPOINTMENT (OUTPATIENT)
Dept: PAIN MANAGEMENT | Facility: CLINIC | Age: 74
End: 2024-12-11
Payer: MEDICARE

## 2024-12-11 DIAGNOSIS — M54.12 RADICULOPATHY, CERVICAL REGION: ICD-10-CM

## 2024-12-11 PROCEDURE — 62321 NJX INTERLAMINAR CRV/THRC: CPT

## 2025-01-07 ENCOUNTER — APPOINTMENT (OUTPATIENT)
Dept: PAIN MANAGEMENT | Facility: CLINIC | Age: 75
End: 2025-01-07

## 2025-04-03 ENCOUNTER — APPOINTMENT (OUTPATIENT)
Dept: PAIN MANAGEMENT | Facility: CLINIC | Age: 75
End: 2025-04-03
Payer: MEDICARE

## 2025-04-03 VITALS — BODY MASS INDEX: 24.08 KG/M2 | WEIGHT: 172 LBS | HEIGHT: 71 IN

## 2025-04-03 DIAGNOSIS — M47.22 OTHER SPONDYLOSIS WITH RADICULOPATHY, CERVICAL REGION: ICD-10-CM

## 2025-04-03 DIAGNOSIS — M50.320 OTHER CERVICAL DISC DEGENERATION, MID-CERVICAL REGION, UNSPECIFIED LEVEL: ICD-10-CM

## 2025-04-03 DIAGNOSIS — M54.12 RADICULOPATHY, CERVICAL REGION: ICD-10-CM

## 2025-04-03 DIAGNOSIS — M48.02 SPINAL STENOSIS, CERVICAL REGION: ICD-10-CM

## 2025-04-03 PROCEDURE — 99214 OFFICE O/P EST MOD 30 MIN: CPT

## 2025-04-16 ENCOUNTER — APPOINTMENT (OUTPATIENT)
Dept: PAIN MANAGEMENT | Facility: CLINIC | Age: 75
End: 2025-04-16
Payer: MEDICARE

## 2025-04-16 DIAGNOSIS — M54.12 RADICULOPATHY, CERVICAL REGION: ICD-10-CM

## 2025-04-16 PROCEDURE — 62321 NJX INTERLAMINAR CRV/THRC: CPT

## 2025-05-01 ENCOUNTER — APPOINTMENT (OUTPATIENT)
Dept: PAIN MANAGEMENT | Facility: CLINIC | Age: 75
End: 2025-05-01